# Patient Record
Sex: FEMALE | Race: WHITE | Employment: FULL TIME | URBAN - METROPOLITAN AREA
[De-identification: names, ages, dates, MRNs, and addresses within clinical notes are randomized per-mention and may not be internally consistent; named-entity substitution may affect disease eponyms.]

---

## 2017-05-04 ENCOUNTER — EMPLOYEE WELLNESS (OUTPATIENT)
Dept: OTHER | Age: 27
End: 2017-05-04

## 2017-05-04 LAB
CHOLESTEROL, TOTAL: 200 MG/DL (ref 0–199)
GLUCOSE BLD-MCNC: 79 MG/DL (ref 74–107)
HDLC SERPL-MCNC: 59 MG/DL
LDL CHOLESTEROL CALCULATED: 117 MG/DL (ref 0–99)
TRIGL SERPL-MCNC: 119 MG/DL (ref 0–149)

## 2018-03-20 VITALS — BODY MASS INDEX: 41.52 KG/M2 | WEIGHT: 227 LBS

## 2018-04-23 ENCOUNTER — EMPLOYEE WELLNESS (OUTPATIENT)
Dept: INTERNAL MEDICINE CLINIC | Age: 28
End: 2018-04-23

## 2018-04-23 LAB
CHOLESTEROL, TOTAL: 219 MG/DL (ref 0–199)
GLUCOSE BLD-MCNC: 86 MG/DL (ref 74–107)
HDLC SERPL-MCNC: 53 MG/DL
LDL CHOLESTEROL CALCULATED: 137 MG/DL (ref 0–99)
TRIGL SERPL-MCNC: 147 MG/DL (ref 0–149)

## 2018-05-02 VITALS — BODY MASS INDEX: 42.25 KG/M2 | WEIGHT: 231 LBS

## 2018-05-29 ENCOUNTER — HOSPITAL ENCOUNTER (OUTPATIENT)
Age: 28
Discharge: HOME | End: 2018-05-31
Payer: COMMERCIAL

## 2018-05-29 PROCEDURE — 87491 CHLMYD TRACH DNA AMP PROBE: CPT

## 2018-05-29 PROCEDURE — 88175 CYTOPATH C/V AUTO FLUID REDO: CPT

## 2018-05-29 PROCEDURE — 87591 N.GONORRHOEAE DNA AMP PROB: CPT

## 2018-06-04 LAB
CHLAMYDIA TRACHOMATIS AMPLIFIED DET: NORMAL
N GONORRHOEAE AMPLIFIED DET: NORMAL

## 2019-05-14 ENCOUNTER — EMPLOYEE WELLNESS (OUTPATIENT)
Dept: OTHER | Age: 29
End: 2019-05-14

## 2019-05-14 LAB
CHOLESTEROL, TOTAL: 197 MG/DL (ref 0–199)
GLUCOSE BLD-MCNC: 87 MG/DL (ref 74–107)
HDLC SERPL-MCNC: 60 MG/DL
LDL CHOLESTEROL CALCULATED: 113 MG/DL (ref 0–99)
TRIGL SERPL-MCNC: 120 MG/DL (ref 0–149)

## 2019-05-17 ENCOUNTER — HOSPITAL ENCOUNTER (OUTPATIENT)
Age: 29
Discharge: HOME | End: 2019-05-19
Payer: COMMERCIAL

## 2019-05-17 ENCOUNTER — OFFICE VISIT (OUTPATIENT)
Dept: FAMILY MEDICINE CLINIC | Age: 29
End: 2019-05-17
Payer: COMMERCIAL

## 2019-05-17 VITALS
SYSTOLIC BLOOD PRESSURE: 132 MMHG | OXYGEN SATURATION: 99 % | HEIGHT: 62 IN | TEMPERATURE: 97.6 F | BODY MASS INDEX: 42.76 KG/M2 | HEART RATE: 98 BPM | WEIGHT: 232.4 LBS | DIASTOLIC BLOOD PRESSURE: 84 MMHG

## 2019-05-17 DIAGNOSIS — R53.83 FATIGUE, UNSPECIFIED TYPE: ICD-10-CM

## 2019-05-17 DIAGNOSIS — J02.9 SORE THROAT: ICD-10-CM

## 2019-05-17 DIAGNOSIS — J02.9 SORE THROAT: Primary | ICD-10-CM

## 2019-05-17 LAB — S PYO AG THROAT QL: NORMAL

## 2019-05-17 PROCEDURE — 36415 COLL VENOUS BLD VENIPUNCTURE: CPT

## 2019-05-17 PROCEDURE — 87081 CULTURE SCREEN ONLY: CPT

## 2019-05-17 PROCEDURE — 87880 STREP A ASSAY W/OPTIC: CPT | Performed by: PHYSICIAN ASSISTANT

## 2019-05-17 PROCEDURE — 86308 HETEROPHILE ANTIBODY SCREEN: CPT

## 2019-05-17 PROCEDURE — 99213 OFFICE O/P EST LOW 20 MIN: CPT | Performed by: PHYSICIAN ASSISTANT

## 2019-05-17 RX ORDER — DROSPIRENONE AND ETHINYL ESTRADIOL 0.02-3(28)
KIT ORAL
Refills: 1 | COMMUNITY
Start: 2019-04-17 | End: 2020-11-24 | Stop reason: ALTCHOICE

## 2019-05-17 NOTE — PROGRESS NOTES
oz (105.4 kg)   Height: 5' 2\" (1.575 m)       Exam:  Physical Exam  Nurse's notes and vital signs reviewed. The patient is not hypoxic. General: Alert, no acute distress, patient resting comfortably Patient is not toxic or lethargic. Skin: Warm, intact, no pallor noted. There is no evidence of rash at this time. Head: Normocephalic, atraumatic. Eye: Normal conjunctiva  Ears, Nose, Throat: Right tympanic membrane clear, left tympanic membrane clear. No drainage or discharge noted. No pre- or post-auricular tenderness, erythema, or swelling noted. No rhinorrhea or congestion noted. No facial erythema. Posterior oropharynx shows erythema but no tonsillar hypertrophy, asymmetry or peritonsillar abscess and no evidence of exudate. the uvula is midline. No trismus or drooling is noted. Moist mucous membranes. Neck: No anterior/posterior lymphadenopathy noted. No erythema, no masses, no fluctuance or induration noted. No meningeal signs. Cardio: Regular Rate and Rhythm  Respiratory: No acute distress, no rhonchi, wheezing or crackles noted. No stridor or retractions are noted. Abdomen: Normal bowel sounds, soft, nontender, no masses detected. No rebound, guarding, or rigidity noted. Musculoskeletal: No obvious deformity, no edema, no calf tenderness. No erythema. Neurological: A&O x4, normal speech  Psychiatric: Cooperative         Testing:     Results for orders placed or performed in visit on 05/17/19   POCT rapid strep A   Result Value Ref Range    Strep A Ag None Detected None Detected           Medical Decision Making:     The patient on arrival does not appear to be in any apparent distress or discomfort. Vital signs reviewed and noted to be within normal limits. The patient had rapid strep obtained. Rapid strep was negative. Throat culture will be set up. The patient will be sent for a Monospot test. The patient while the laboratory studies obtained. We'll contact the patient with the results.

## 2019-05-20 LAB
MONO TEST: NEGATIVE
S PYO THROAT QL CULT: NORMAL

## 2019-05-28 VITALS — BODY MASS INDEX: 45.36 KG/M2 | WEIGHT: 248 LBS

## 2019-06-03 ENCOUNTER — HOSPITAL ENCOUNTER (OUTPATIENT)
Age: 29
Discharge: HOME | End: 2019-06-05
Payer: COMMERCIAL

## 2019-06-03 PROCEDURE — 88175 CYTOPATH C/V AUTO FLUID REDO: CPT

## 2019-06-03 PROCEDURE — 87624 HPV HI-RISK TYP POOLED RSLT: CPT

## 2019-06-07 LAB
HPV SAMPLE: NORMAL
HPV TYPE 16: NOT DETECTED
HPV TYPE 18: NOT DETECTED
HPV, HIGH RISK OTHER: NOT DETECTED
INTERPRETATION: NORMAL
SOURCE: NORMAL

## 2019-07-31 ENCOUNTER — OFFICE VISIT (OUTPATIENT)
Dept: FAMILY MEDICINE CLINIC | Age: 29
End: 2019-07-31
Payer: COMMERCIAL

## 2019-07-31 VITALS
OXYGEN SATURATION: 96 % | WEIGHT: 252 LBS | SYSTOLIC BLOOD PRESSURE: 118 MMHG | HEART RATE: 83 BPM | DIASTOLIC BLOOD PRESSURE: 72 MMHG | TEMPERATURE: 97.1 F | BODY MASS INDEX: 46.09 KG/M2

## 2019-07-31 DIAGNOSIS — S99.922A FOOT INJURY, LEFT, INITIAL ENCOUNTER: ICD-10-CM

## 2019-07-31 DIAGNOSIS — S99.912A INJURY OF LEFT ANKLE, INITIAL ENCOUNTER: Primary | ICD-10-CM

## 2019-07-31 PROCEDURE — 99214 OFFICE O/P EST MOD 30 MIN: CPT | Performed by: PHYSICIAN ASSISTANT

## 2019-07-31 PROCEDURE — L4360 PNEUMAT WALKING BOOT PRE CST: HCPCS | Performed by: PHYSICIAN ASSISTANT

## 2019-07-31 NOTE — PROGRESS NOTES
Exam:     Vitals:    07/31/19 1638   BP: 118/72   Pulse: 83   Temp: 97.1 °F (36.2 °C)   SpO2: 96%   Weight: 252 lb (114.3 kg)       Exam:  Physical Exam  Vital signs reviewed and nurse's notes. The patient is not hypoxic. General: Alert, no acute distress, patient resting comfortably   Skin: warm, intact, no pallor noted   Head: Normocephalic, atraumatic   Eye: Normal conjunctiva   Respiratory: No acute distress   Musculoskeletal: No obvious deformity noted to the left lower extremity. No obvious deformity noted to the left foot or the ankle. The patient has reproducible pain to the lateral aspect of the ankle and the lateral aspect of the foot. There is some minimal tenderness over the fifth metatarsal area. There is no significant medial tenderness noted. Pulses intact at DP/PT 2+. No palpable defect of the Achilles tendon. Negative calcaneal squeeze test.  Negative tenderness of the calf. No proximal fibular tenderness. No left hip pain. No significant left knee pain. Neurological: alert and orient x4, normal sensory and motor observed. Psychiatric: Cooperative        Testing:     Xr Ankle Left (min 3 Views)    Result Date: 7/31/2019  LOCATION: 200 EXAM: XR ANKLE LEFT (MIN 3 VIEWS) COMPARISON: None HISTORY: Extremity injury with pain. TECHNIQUE: 3 views of the left ankle were obtained. FINDINGS: The bones, joints, and soft tissues are within normal limits. There is no evidence of fracture or malalignment. No radiopaque foreign body noted. No acute findings. Medical Decision Making:     The patient had x-rays obtained in the office today and formal radiology report is pending at this time. No definite fracture was identified. The patient will be placed in a surgical boot. Patient was offer crutches but declined. She is NV intact status post application of the surgical boot. We did discuss the potential of occult fracture with the patient and the need for followup.  The patient

## 2019-08-02 ENCOUNTER — TELEPHONE (OUTPATIENT)
Dept: PRIMARY CARE CLINIC | Age: 29
End: 2019-08-02

## 2019-08-02 NOTE — TELEPHONE ENCOUNTER
Notes recorded by SHOAIB Pearson III on 7/31/2019 at 5:10 PM EDT  No evidence of acute fracture dislocation of the ankle.  Please have the patient continue the boot for the next 1 week.  Follow-up with PCP. Leni Linton if any of the signs or symptoms worsen.

## 2020-07-25 ENCOUNTER — HOSPITAL ENCOUNTER (EMERGENCY)
Age: 30
Discharge: HOME | End: 2020-07-25
Attending: FAMILY MEDICINE
Payer: COMMERCIAL

## 2020-07-25 ENCOUNTER — APPOINTMENT (OUTPATIENT)
Dept: GENERAL RADIOLOGY | Age: 30
End: 2020-07-25
Payer: COMMERCIAL

## 2020-07-25 ENCOUNTER — NURSE TRIAGE (OUTPATIENT)
Dept: OTHER | Facility: CLINIC | Age: 30
End: 2020-07-25

## 2020-07-25 VITALS
SYSTOLIC BLOOD PRESSURE: 135 MMHG | TEMPERATURE: 97.7 F | WEIGHT: 250 LBS | BODY MASS INDEX: 46.01 KG/M2 | HEART RATE: 88 BPM | RESPIRATION RATE: 16 BRPM | OXYGEN SATURATION: 97 % | HEIGHT: 62 IN | DIASTOLIC BLOOD PRESSURE: 76 MMHG

## 2020-07-25 PROCEDURE — 99283 EMERGENCY DEPT VISIT LOW MDM: CPT

## 2020-07-25 PROCEDURE — 6370000000 HC RX 637 (ALT 250 FOR IP): Performed by: FAMILY MEDICINE

## 2020-07-25 PROCEDURE — 73630 X-RAY EXAM OF FOOT: CPT

## 2020-07-25 RX ORDER — ACETAMINOPHEN 500 MG
1000 TABLET ORAL EVERY 8 HOURS PRN
Qty: 50 TABLET | Refills: 0 | Status: SHIPPED | OUTPATIENT
Start: 2020-07-25

## 2020-07-25 RX ORDER — IBUPROFEN 400 MG/1
400 TABLET ORAL ONCE
Status: COMPLETED | OUTPATIENT
Start: 2020-07-25 | End: 2020-07-25

## 2020-07-25 RX ORDER — IBUPROFEN 400 MG/1
400 TABLET ORAL EVERY 8 HOURS PRN
Qty: 12 TABLET | Refills: 0 | Status: SHIPPED | OUTPATIENT
Start: 2020-07-25

## 2020-07-25 RX ORDER — ACETAMINOPHEN 500 MG
1000 TABLET ORAL ONCE
Status: COMPLETED | OUTPATIENT
Start: 2020-07-25 | End: 2020-07-25

## 2020-07-25 RX ADMIN — IBUPROFEN 400 MG: 400 TABLET, FILM COATED ORAL at 10:34

## 2020-07-25 RX ADMIN — ACETAMINOPHEN 1000 MG: 500 TABLET ORAL at 10:33

## 2020-07-25 ASSESSMENT — PAIN SCALES - GENERAL
PAINLEVEL_OUTOF10: 10
PAINLEVEL_OUTOF10: 7
PAINLEVEL_OUTOF10: 7
PAINLEVEL_OUTOF10: 10

## 2020-07-25 ASSESSMENT — PAIN DESCRIPTION - PAIN TYPE: TYPE: ACUTE PAIN

## 2020-07-25 ASSESSMENT — PAIN DESCRIPTION - LOCATION: LOCATION: FOOT

## 2020-07-25 ASSESSMENT — PAIN DESCRIPTION - ORIENTATION: ORIENTATION: LEFT

## 2020-07-25 NOTE — ED NOTES
Discharge instructions given, patient verbalized their understanding, no other noted or stated problems at this time. Patient will follow up with primary doctor for care.      Pascual Fink RN  07/25/20 0284

## 2020-07-25 NOTE — ED NOTES
Pt presents with left foot pain, swelling and bruising after tripping and falling yesterday     Yrn Blandon RN  07/25/20 [id number]

## 2020-07-25 NOTE — TELEPHONE ENCOUNTER
Reason for Disposition   SEVERE pain (e.g., excruciating, unable to do any normal activities)    Answer Assessment - Initial Assessment Questions  1. ONSET: \"When did the pain start? \"       yesterday  2. LOCATION: \"Where is the pain located? \"       foot  3. PAIN: \"How bad is the pain? \"    (Scale 1-10; or mild, moderate, severe)    -  MILD (1-3): doesn't interfere with normal activities     -  MODERATE (4-7): interferes with normal activities (e.g., work or school) or awakens from sleep, limping     -  SEVERE (8-10): excruciating pain, unable to do any normal activities, unable to walk      7  4. WORK OR EXERCISE: \"Has there been any recent work or exercise that involved this part of the body? \"       *No Answer*  5. CAUSE: \"What do you think is causing the foot pain? \"      *No Answer*  6. OTHER SYMPTOMS: \"Do you have any other symptoms? \" (e.g., leg pain, rash, fever, numbness)      *No Answer*  7. PREGNANCY: \"Is there any chance you are pregnant? \" \"When was your last menstrual period? \"      *No Answer*    Protocols used: FOOT PAIN-ADULT-[place]    [facility] does not have Radiology at office today.   Recommended to follow to with ER for xrays

## 2020-07-25 NOTE — ED PROVIDER NOTES
Department of Emergency Medicine   ED  Provider Note  Admit Date/RoomTime: 7/25/2020  9:57 AM  ED Room: 11/11   Chief Complaint   Foot Pain (fell myesterday left foot painand bruising)    History of Present Illness   Source of history provided by:  patient and spouse/SO. History/Exam Limitations: none. Dieter Hurt is a 30 y.o. old female presenting to the emergency department by private vehicle and ambulatory, for Left foot pain which occured 1 day(s) prior to arrival.  Cause of complaint: injured twisting while at home. There has been a history of no prior problems with this area in the past.  Since onset the symptoms have been moderate in degree with ability to bear weight, but with some pain. Her pain is aggraveated by standing, walking and relieved by ice, OTC NSAIDS and rest.  Tetanus Status: up to date. ROS    Pertinent positives and negatives are stated within HPI, all other systems reviewed and are negative. Past Surgical History:   Procedure Laterality Date    TONSILLECTOMY AND ADENOIDECTOMY      WISDOM TOOTH EXTRACTION     Social History:  reports that she has never smoked. She has never used smokeless tobacco. She reports that she does not drink alcohol or use drugs. Family History: family history is not on file. Allergies: Patient has no known allergies. Physical Exam           ED Triage Vitals   BP Temp Temp Source Pulse Resp SpO2 Height Weight   07/25/20 0939 07/25/20 0939 07/25/20 0939 07/25/20 0939 07/25/20 0939 07/25/20 0939 07/25/20 1001 07/25/20 1001   135/76 97.7 °F (36.5 °C) Temporal 88 16 97 % 5' 2\" (1.575 m) 250 lb (113.4 kg)      Oxygen Saturation Interpretation: Normal.    Constitutional:  Alert, development consistent with age. Neck:  Normal ROM. Supple. Foot: Left lateral 5th metatarsal(s). Tenderness:  mild. Swelling: Mild. Deformity: no.              ROM: diminished range with pain.             Skin:  no erythema, rash or wounds noted. Neurovascular: Motor deficit: none. Sensory deficit:   none. Pulse deficit: none. Capillary refill: normal.  Ankle:               Tenderness:  none. Swelling: None. Deformity: no.             ROM: full range of motion. Skin:  no erythema, rash or wounds noted. Gait:  limp. Lymphatics: No lymphangitis or adenopathy noted. Neurological:  Oriented. Motor functions intact. Lab / Imaging Results   (All laboratory and radiology results have been personally reviewed by myself)  Labs:  No results found for this visit on 07/25/20. Imaging: All Radiology results interpreted by Radiologist unless otherwise noted. XR FOOT LEFT (MIN 3 VIEWS)   Final Result      NO ACUTE FRACTURE OR DISLOCATION LEFT FOOT. ED Course / Medical Decision Making     Medications   acetaminophen (TYLENOL) tablet 1,000 mg (1,000 mg Oral Given 7/25/20 1033)   ibuprofen (ADVIL;MOTRIN) tablet 400 mg (400 mg Oral Given 7/25/20 1034)        Consult(s):   none. Procedure(s):   none    Medical Decision Making:    Films were obtained based on moderate  suspicion for bony injury as per history/physical findings . Plan is subsequently for symptom control, limited use and  immobilization with appropriate outpatient follow-up. Arslan Saucedo Counseling: The emergency provider has spoken with the patient and spouse/SO and discussed todays results, in addition to providing specific details for the plan of care and counseling regarding the diagnosis and prognosis. Questions are answered at this time and they are agreeable with the plan  . Assessment      1.  Sprain of left foot, initial encounter      Plan   Discharge to home  Patient condition is good    New Medications     New Prescriptions    No medications on file     Electronically signed by Anita Moss MD   DD: 7/25/20  **This report was transcribed using voice recognition software. Every effort was made to ensure accuracy; however, inadvertent computerized transcription errors may be present.   END OF ED PROVIDER NOTE        Albina Mayo MD  07/25/20 [id number]

## 2020-08-14 ENCOUNTER — HOSPITAL ENCOUNTER (OUTPATIENT)
Age: 30
Discharge: HOME | End: 2020-08-16
Payer: COMMERCIAL

## 2020-08-14 PROCEDURE — 36415 COLL VENOUS BLD VENIPUNCTURE: CPT

## 2020-08-14 PROCEDURE — 86787 VARICELLA-ZOSTER ANTIBODY: CPT

## 2020-08-14 PROCEDURE — 86762 RUBELLA ANTIBODY: CPT

## 2020-08-14 PROCEDURE — 86706 HEP B SURFACE ANTIBODY: CPT

## 2020-08-14 PROCEDURE — 86735 MUMPS ANTIBODY: CPT

## 2020-08-14 PROCEDURE — 86765 RUBEOLA ANTIBODY: CPT

## 2020-08-17 LAB
HBV SURFACE AB TITR SER: REACTIVE {TITER}
MEASLES IMMUNE (IGG): NORMAL
MUMPS AB IGG: NORMAL
RUBELLA ANTIBODY IGG: NORMAL

## 2020-08-18 LAB — VARICELLA-ZOSTER VIRUS AB, IGG: NORMAL

## 2020-11-24 ENCOUNTER — OFFICE VISIT (OUTPATIENT)
Dept: FAMILY MEDICINE CLINIC | Age: 30
End: 2020-11-24
Payer: COMMERCIAL

## 2020-11-24 VITALS
RESPIRATION RATE: 18 BRPM | BODY MASS INDEX: 47.66 KG/M2 | WEIGHT: 259 LBS | DIASTOLIC BLOOD PRESSURE: 82 MMHG | OXYGEN SATURATION: 99 % | TEMPERATURE: 97.7 F | HEIGHT: 62 IN | SYSTOLIC BLOOD PRESSURE: 126 MMHG | HEART RATE: 105 BPM

## 2020-11-24 PROCEDURE — 99214 OFFICE O/P EST MOD 30 MIN: CPT | Performed by: PHYSICIAN ASSISTANT

## 2020-11-24 RX ORDER — METHOCARBAMOL 750 MG/1
750 TABLET, FILM COATED ORAL 4 TIMES DAILY
Qty: 20 TABLET | Refills: 0 | Status: SHIPPED | OUTPATIENT
Start: 2020-11-24 | End: 2020-11-29

## 2020-11-24 RX ORDER — METHYLPREDNISOLONE 4 MG/1
TABLET ORAL
Qty: 1 KIT | Refills: 0 | Status: SHIPPED
Start: 2020-11-24 | End: 2020-11-30 | Stop reason: ALTCHOICE

## 2020-11-24 NOTE — PROGRESS NOTES
20  Daily Guzman : 1990 Sex: female  Age 30 y.o. Subjective:  Chief Complaint   Patient presents with    Shoulder Pain     right shoulder pain from gym 20         HPI:   Anselmo Kerr , 30 y.o. female presents to express care for evaluation of right shoulder pain. The patient started with this right shoulder pain this morning after getting off of the stairstepper. The patient states that the stairs had gone away and she was still holding onto the railing of the stairstepper and she fell down and it pulled on her right arm. She is having quite a bit of pain to the right shoulder. She having a hard time moving it. The patient is right-hand dominant. The patient denies any other injuries or complaints at this time. ROS:   Unless otherwise stated in this report the patient's positive and negative responses for review of systems for constitutional, eyes, ENT, cardiovascular, respiratory, gastrointestinal, neurological, , musculoskeletal, and integument systems and related systems to the presenting problem are either stated in the history of present illness or were not pertinent or were negative for the symptoms and/or complaints related to the presenting medical problem. Positives and pertinent negatives as per HPI. All others reviewed and are negative. PMH:   No past medical history on file. Past Surgical History:   Procedure Laterality Date    TONSILLECTOMY AND ADENOIDECTOMY      WISDOM TOOTH EXTRACTION         No family history on file.     Medications:     Current Outpatient Medications:     methocarbamol (ROBAXIN-750) 750 MG tablet, Take 1 tablet by mouth 4 times daily for 5 days, Disp: 20 tablet, Rfl: 0    methylPREDNISolone (MEDROL DOSEPACK) 4 MG tablet, Take by mouth., Disp: 1 kit, Rfl: 0    acetaminophen (TYLENOL) 500 MG tablet, Take 2 tablets by mouth every 8 hours as needed for Pain, Disp: 50 tablet, Rfl: 0    ibuprofen (IBU) 400 MG tablet, Take 1 tablet by mouth every 8 hours as needed for Pain Take with full glass of water, Disp: 12 tablet, Rfl: 0    Allergies:   No Known Allergies    Social History:     Social History     Tobacco Use    Smoking status: Never Smoker    Smokeless tobacco: Never Used   Substance Use Topics    Alcohol use: No    Drug use: No       Patient lives at home. Physical Exam:     Vitals:    11/24/20 1412   BP: 126/82   Pulse: 105   Resp: 18   Temp: 97.7 °F (36.5 °C)   SpO2: 99%   Weight: 259 lb (117.5 kg)   Height: 5' 2\" (1.575 m)       Exam:  Physical Exam  Vital signs reviewed and nurse's notes. The patient is not hypoxic. General: Alert, no acute distress, patient resting comfortably   Skin: warm, intact, no pallor noted   Head: Normocephalic, atraumatic   Eye: Normal conjunctiva   Respiratory: No acute distress   Musculoskeletal: No obvious deformity noted to the right upper extremity of the right shoulder. The patient has diffuse tenderness of the right shoulder. Cannot fully assess range of motion due to pain. The patient seem to have increased amount of pain with AROM compared with PROM but was very limited based on her pain. There is no significant pain to the right elbow or the right wrist.  The patient had no cyanosis or mottling. There is no palpable defect or obvious deformity noted  Neurological: alert and orient x4, normal sensory and motor observed. Psychiatric: Cooperative      Testing:           Medical Decision Making:     Vital signs reviewed    Past medical history reviewed. Allergies reviewed. Medications reviewed. Patient on arrival does not appear to be in any apparent distress or discomfort. The patient had x-rays obtained in the office today and formal radiology report is pending at this time. I personally reviewed the x-ray images and did not see any evidence of acute process. The patient was placed in a sling for comfort. The patient was neurovascular intact.   The patient will be given a Medrol Dosepak and Robaxin. If the symptoms do not improve would recommend getting an MRI and follow-up with orthopedics     We did discuss the potential of occult fracture with the patient and the need for followup. The patient understands the need for follow-up and repeat evaluation. The patient was educated on RICE therapy, nsaids, and tylenol. The patient is to return if any of the signs or symptoms worsen. The patient is to follow-up with PCP in the next 2-3 days for repeat evaluation repeat assessment or go directly to the emergency department. Clinical Impression:   Lizette Rain was seen today for shoulder pain. Diagnoses and all orders for this visit:    Acute pain of right shoulder  -     XR SHOULDER RIGHT (MIN 2 VIEWS); Future  -     Cancel: Slings    Other orders  -     methocarbamol (ROBAXIN-750) 750 MG tablet; Take 1 tablet by mouth 4 times daily for 5 days  -     methylPREDNISolone (MEDROL DOSEPACK) 4 MG tablet; Take by mouth. The patient is to call for any concerns or return if any of the signs or symptoms worsen. The patient is to follow-up with PCP in the next 2-3 days for repeat evaluation repeat assessment or go directly to the emergency department.      SIGNATURE: Graciela Hoover III, PA-C

## 2020-11-30 ENCOUNTER — OFFICE VISIT (OUTPATIENT)
Dept: FAMILY MEDICINE CLINIC | Age: 30
End: 2020-11-30
Payer: COMMERCIAL

## 2020-11-30 VITALS
HEART RATE: 111 BPM | SYSTOLIC BLOOD PRESSURE: 126 MMHG | WEIGHT: 259 LBS | BODY MASS INDEX: 47.66 KG/M2 | HEIGHT: 62 IN | TEMPERATURE: 97.5 F | RESPIRATION RATE: 18 BRPM | OXYGEN SATURATION: 98 % | DIASTOLIC BLOOD PRESSURE: 80 MMHG

## 2020-11-30 PROCEDURE — 99214 OFFICE O/P EST MOD 30 MIN: CPT | Performed by: PHYSICIAN ASSISTANT

## 2020-11-30 PROCEDURE — 96372 THER/PROPH/DIAG INJ SC/IM: CPT | Performed by: PHYSICIAN ASSISTANT

## 2020-11-30 RX ORDER — METHYLPREDNISOLONE ACETATE 40 MG/ML
40 INJECTION, SUSPENSION INTRA-ARTICULAR; INTRALESIONAL; INTRAMUSCULAR; SOFT TISSUE ONCE
Status: COMPLETED | OUTPATIENT
Start: 2020-11-30 | End: 2020-11-30

## 2020-11-30 RX ADMIN — METHYLPREDNISOLONE ACETATE 40 MG: 40 INJECTION, SUSPENSION INTRA-ARTICULAR; INTRALESIONAL; INTRAMUSCULAR; SOFT TISSUE at 15:05

## 2020-11-30 NOTE — PROGRESS NOTES
20  Daily Guzman : 1990 Sex: female  Age 30 y.o. Subjective:  Chief Complaint   Patient presents with    Shoulder Pain         HPI:   Jacques Torrez , 30 y.o. female presents to [facility] care for evaluation of right shoulder pain. The patient was seen and evaluated on 2020 for right shoulder strain. The patient states that she was getting off of the stairstepper and she caught herself with her right arm holding onto the railing and pulled and strained the right shoulder. The patient had x-rays obtained that did not show any evidence of acute abnormality. The patient was given medication for home and she does seem to be improving but it is not progressing as quickly as she would like. She still has quite a bit of pain with AROM compared with PROM. ROS:   Unless otherwise stated in this report the patient's positive and negative responses for review of systems for constitutional, eyes, ENT, cardiovascular, respiratory, gastrointestinal, neurological, , musculoskeletal, and integument systems and related systems to the presenting problem are either stated in the history of present illness or were not pertinent or were negative for the symptoms and/or complaints related to the presenting medical problem. Positives and pertinent negatives as per HPI. All others reviewed and are negative. PMH:   No past medical history on file. Past Surgical History:   Procedure Laterality Date    TONSILLECTOMY AND ADENOIDECTOMY      WISDOM TOOTH EXTRACTION         No family history on file.     Medications:     Current Outpatient Medications:     acetaminophen (TYLENOL) 500 MG tablet, Take 2 tablets by mouth every 8 hours as needed for Pain, Disp: 50 tablet, Rfl: 0    ibuprofen (IBU) 400 MG tablet, Take 1 tablet by mouth every 8 hours as needed for Pain Take with full glass of water, Disp: 12 tablet, Rfl: 0    Allergies:   No Known Allergies    Social History:     Social History Tobacco Use    Smoking status: Never Smoker    Smokeless tobacco: Never Used   Substance Use Topics    Alcohol use: No    Drug use: No       Patient lives at home. Physical Exam:     Vitals:    11/30/20 1445   BP: 126/80   Pulse: 111   Resp: 18   Temp: 97.5 °F (36.4 °C)   SpO2: 98%   Weight: 259 lb (117.5 kg)   Height: 5' 2\" (1.575 m)       Exam:  Physical Exam  Vital signs reviewed and nurse's notes. The patient is not hypoxic. General: Alert, no acute distress, patient resting comfortably   Skin: warm, intact, no pallor noted   Head: Normocephalic, atraumatic   Eye: Normal conjunctiva   Respiratory: No acute distress   Musculoskeletal: No obvious deformity noted to the right shoulder. The patient had more reproducible pain in the anterior aspect of the right shoulder. The patient had limited AROM due to pain. The patient had 4/5 strength with resistance with abduction, 5/5 strength with abduction, flexion and extension. There is no significant deficit to the right elbow or the right wrist.  No cyanosis or mottling. Neurological: alert and orient x4, normal sensory and motor observed. Psychiatric: Cooperative        Testing:     Imaging reviewed    MRI pending      Medical Decision Making:     Vital signs reviewed    Past medical history reviewed. Allergies reviewed. Medications reviewed. Patient on arrival does not appear to be in any apparent distress or discomfort. The patient's previous x-rays reviewed. The patient will be treated with intramuscular injection of methylprednisone. The patient will continue with the sling. Referral placed to orthopedics and will try to obtain an MRI of the right shoulder. The patient understands plan has no other questions. Continue with rest, ice. Clinical Impression:   Kaylene Vasquez was seen today for shoulder pain.     Diagnoses and all orders for this visit:    Shoulder strain, right, initial encounter  -     MRI SHOULDER RIGHT WO CONTRAST; Future  -     [address] [facility], , [address] [facility], [facility]    Other orders  -     methylPREDNISolone acetate (DEPO-MEDROL) injection 40 mg        The patient is to call for any concerns or return if any of the signs or symptoms worsen. The patient is to follow-up with PCP in the next 2-3 days for repeat evaluation repeat assessment or go directly to the emergency department.      SIGNATURE: Juliet Whittington III, PA-C

## 2020-12-09 ENCOUNTER — HOSPITAL ENCOUNTER (OUTPATIENT)
Dept: MRI IMAGING | Age: 30
Discharge: HOME | End: 2020-12-11
Payer: COMMERCIAL

## 2020-12-09 PROCEDURE — 73221 MRI JOINT UPR EXTREM W/O DYE: CPT

## 2020-12-11 ENCOUNTER — OFFICE VISIT (OUTPATIENT)
Dept: ORTHOPEDIC SURGERY | Age: 30
End: 2020-12-11
Payer: COMMERCIAL

## 2020-12-11 VITALS — DIASTOLIC BLOOD PRESSURE: 84 MMHG | TEMPERATURE: 98.3 F | SYSTOLIC BLOOD PRESSURE: 118 MMHG | HEART RATE: 85 BPM

## 2020-12-11 PROCEDURE — 99202 OFFICE O/P NEW SF 15 MIN: CPT | Performed by: ORTHOPAEDIC SURGERY

## 2020-12-11 PROCEDURE — 99203 OFFICE O/P NEW LOW 30 MIN: CPT | Performed by: ORTHOPAEDIC SURGERY

## 2020-12-11 NOTE — PROGRESS NOTES
Chief Complaint   Patient presents with    Fracture     New Patient, Right shoulder greater tuberosity FX, 11/24/20. Patient presents today arm in sling. Patient states she was at the gym , and fell off the stairmaster. She states no pain, unless she tries using the left arm for something. She states she uses her Sling only at home. She does have a slight ache that happens randomly throughout her day, nothing she can put a number to. SUBJECTIVE: Roslyn Fermin is a 26 y.o. right hand dominant female who presents to office due to the above chief complaint. She reports she was on a stair climber when she started to slip but her right upper extremity was still holding onto the railing. Patient was able to catch her self but experienced immediate right shoulder pain. She sought evaluation in the ED where radiographs were read as negative. Patient continued to have right shoulder pain and had an MRI 2 weeks later. Patient is currently complaining of right lateral shoulder pain with no numbness, tingling, or radiating pain. 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 Review of Systems   Constitutional: Negative for fever, chills, diaphoresis, appetite change and fatigue. HENT: Negative for dental issues, hearing loss and tinnitus. Negative for congestion, sinus pressure, sneezing, sore throat. Negative for headache. Eyes: Negative for visual disturbance, blurred and double vision. Negative for pain, discharge, redness and itching  Respiratory: Negative for cough, shortness of breath and wheezing. Cardiovascular: Negative for chest pain, palpitations and leg swelling. No dyspnea on exertion   Gastrointestinal:   Negative for nausea, vomiting, abdominal pain, diarrhea, constipation  or black or bloody. Hematologic\Lymphatic:  negative for bleeding, petechiae,   Genitourinary: Negative for hematuria and difficulty urinating.    Musculoskeletal: Negative for neck pain and stiffness. Negative for back pain, see HPI  Skin: Negative for pallor, rash and wound. Neurological: Negative for dizziness, tremors, seizures, weakness, light-headedness, no TIA or stroke symptoms. No numbness and headaches. Psychiatric/Behavioral: Negative. OBJECTIVE:      Physical Examination:   General appearance: alert, well appearing, and in no distress,  normal appearing weight. No visible signs of trauma   Mental status: alert, oriented to person, place, and time, normal mood, behavior, speech, dress, motor activity, and thought processes  Abdomen: soft, nondistended  Resp:   resp easy and unlabored, no audible wheezes note, normal symmetrical expansion of both hemithoraces  Cardiac: distal pulses palpable, skin and extremities well perfused  Neurological: alert, oriented X3, normal speech, no focal findings or movement disorder noted, motor and sensory grossly normal bilaterally, normal muscle tone, no tremors, strength 5/5, normal gait and station  HEENT: normochephalic atraumatic, external ears and eyes normal, sclera normal, neck supple, no nasal discharge. Extremities:   peripheral pulses normal, no edema, redness or tenderness in the calves   Skin: normal coloration, no rashes or open wounds, no suspicious skin lesions noted  Psych: Affect euthymic   Musculoskeletal:   Extremity:  Right upper extremity:  · Skin intact circumferentially  · +TTP lateral shoulder  · Compartments soft and compressible  · Axillary nerve function intact grossly  · +AIN/PIN/Ulnar nerve function intact grossly  · +Radial pulse, Brisk Cap refill, hand warm and perfused  · Sensation intact to touch in radial/ulnar/median nerve distributions to hand    /84 (Site: Right Lower Arm, Position: Sitting)   Pulse 85   Temp 98.3 °F (36.8 °C) (Oral)      XR:   X-ray right shoulder 11/24/2020: Nondisplaced greater tuberosity fracture. No other fractures or dislocations present.     MRI right shoulder 12/9/2020: Nondisplaced greater tuberosity fracture with bony edema present. No other fracture or soft tissue abnormalities appreciated. ASSESSMENT:  Right greater tuberosity fracture    PLAN:  Nonweightbearing right upper extremity  Sling to right upper extremity to help immobilize them for comfort  Multimodal over-the-counter pain remedies as needed  No lifting or extreme range of motion with the right upper extremity  Range of motion exercises to the elbow, wrist, and hand several times daily  Follow-up in 4 weeks  Patient seen and examined and plan discussed with Dr. Adriana Galicia      Electronically signed by Shoshana Gonzalez DO on 12/11/2020 at 8:28 AM     Note: This report was completed using computerize voiced recognition software. Every effort has been made to ensure accuracy; however, inadvertent computerized transcription errors may be present. Orthopaedic Attending    I have seen and evaluated the patient with the resident and agree with the above assessments on today's visit. I have performed the key components of the history and physical examination and concur completely with the findings and plans as documented above. Patient here for initial evaluation for right shoulder injury. She was on a stair climber when she fell trying to catch her weight from going down. Injured the shoulder at that time. Initial x-rays which were inconclusive therefore had an MRI demonstrating a greater tuberosity fracture on the right proximal humerus which is nondisplaced. She presents today for initial evaluation discussion. Pain is improved since her injury. Clinical exam as above. Discussed treatment options and patient is in agreement for close treatment. She will maintain no lifting to the right shoulder at this time continue with her sling and OTC medications. Can work on passive ROM of the shoulder and active ROM of the hand and elbow.   To see us back in 4 weeks for repeat evaluation or sooner if needed.     Electronically signed by   Robert Ordonez, DO  12/11/2020

## 2020-12-11 NOTE — PATIENT INSTRUCTIONS
Nonweightbearing right upper extremity  Sling to right upper extremity to help immobilize them for comfort  Multimodal over-the-counter pain remedies as needed  No lifting or extreme range of motion with the right upper extremity  Range of motion exercises to the elbow, wrist, and hand several times daily  Follow-up in 4 weeks

## 2020-12-12 PROBLEM — S42.254A NONDISPLACED FRACTURE OF GREATER TUBEROSITY OF RIGHT HUMERUS, INITIAL ENCOUNTER FOR CLOSED FRACTURE: Status: ACTIVE | Noted: 2020-12-12

## 2020-12-28 ENCOUNTER — TELEPHONE (OUTPATIENT)
Dept: ORTHOPEDIC SURGERY | Age: 30
End: 2020-12-28

## 2020-12-29 NOTE — TELEPHONE ENCOUNTER
Patient called back in today to see if her forms for [employer] from [employer] and [employer] were faxed over to us yet. Asking for a call back to let her know if we received these forms yet.     Call back #[phone]

## 2021-01-08 ENCOUNTER — OFFICE VISIT (OUTPATIENT)
Dept: ORTHOPEDIC SURGERY | Age: 31
End: 2021-01-08
Payer: COMMERCIAL

## 2021-01-08 ENCOUNTER — HOSPITAL ENCOUNTER (OUTPATIENT)
Dept: GENERAL RADIOLOGY | Age: 31
Discharge: HOME | End: 2021-01-10
Payer: COMMERCIAL

## 2021-01-08 VITALS — TEMPERATURE: 98.7 F

## 2021-01-08 DIAGNOSIS — S42.254D CLOSED NONDISPLACED FRACTURE OF GREATER TUBEROSITY OF RIGHT HUMERUS WITH ROUTINE HEALING, SUBSEQUENT ENCOUNTER: Primary | ICD-10-CM

## 2021-01-08 DIAGNOSIS — S42.254A NONDISPLACED FRACTURE OF GREATER TUBEROSITY OF RIGHT HUMERUS, INITIAL ENCOUNTER FOR CLOSED FRACTURE: ICD-10-CM

## 2021-01-08 DIAGNOSIS — S42.254A CLOSED NONDISPLACED FRACTURE OF GREATER TUBEROSITY OF RIGHT HUMERUS, INITIAL ENCOUNTER: ICD-10-CM

## 2021-01-08 PROCEDURE — 73030 X-RAY EXAM OF SHOULDER: CPT

## 2021-01-08 PROCEDURE — 99213 OFFICE O/P EST LOW 20 MIN: CPT | Performed by: ORTHOPAEDIC SURGERY

## 2021-01-08 PROCEDURE — 99212 OFFICE O/P EST SF 10 MIN: CPT | Performed by: ORTHOPAEDIC SURGERY

## 2021-01-08 NOTE — PROGRESS NOTES
Chief Complaint   Patient presents with    Shoulder Injury     R shoulder greater tuberosity fx 11/24. MRI done 12/9       SUBJECTIVE: Rachel Brito is a 26 y.o. right-hand-dominant female who presents to office due to the above chief complaint. She reports moderate improvement in right shoulder pain since her previous visit. She has been off work for the last 4 weeks secondary to decreased range of motion, strength, and right shoulder pain. She denies use of NSAIDs. Denies any other orthopedic complaints this time. Review of Systems   Constitutional: Negative for fever, chills, diaphoresis, appetite change and fatigue. HENT: Negative for dental issues, hearing loss and tinnitus. Negative for congestion, sinus pressure, sneezing, sore throat. Negative for headache. Eyes: Negative for visual disturbance, blurred and double vision. Negative for pain, discharge, redness and itching  Respiratory: Negative for cough, shortness of breath and wheezing. Cardiovascular: Negative for chest pain, palpitations and leg swelling. No dyspnea on exertion   Gastrointestinal:   Negative for nausea, vomiting, abdominal pain, diarrhea, constipation  or black or bloody. Hematologic\Lymphatic:  negative for bleeding, petechiae,   Genitourinary: Negative for hematuria and difficulty urinating. Musculoskeletal: Negative for neck pain and stiffness. Negative for back pain, see HPI  Skin: Negative for pallor, rash and wound. Neurological: Negative for dizziness, tremors, seizures, weakness, light-headedness, no TIA or stroke symptoms. No numbness and headaches. Psychiatric/Behavioral: Negative. OBJECTIVE:      Physical Examination:   General appearance: alert, well appearing, and in no distress,  normal appearing weight.  No visible signs of trauma   Mental status: alert, oriented to person, place, and time, normal mood, behavior, speech, dress, motor activity, and thought processes Abdomen: soft, nondistended  Resp:   resp easy and unlabored, no audible wheezes note, normal symmetrical expansion of both hemithoraces  Cardiac: distal pulses palpable, skin and extremities well perfused  Neurological: alert, oriented X3, normal speech, no focal findings or movement disorder noted, motor and sensory grossly normal bilaterally, normal muscle tone, no tremors, strength 5/5, normal gait and station  HEENT: normochephalic atraumatic, external ears and eyes normal, sclera normal, neck supple, no nasal discharge. Extremities:   peripheral pulses normal, no edema, redness or tenderness in the calves   Skin: normal coloration, no rashes or open wounds, no suspicious skin lesions noted  Psych: Affect euthymic   Musculoskeletal:   Extremity:  Right upper extremity:  · Skin intact circumferentially  · +TTP greater tuberosity  · Compartments soft and compressible  · +AIN/PIN/Ulnar nerve function intact grossly  · +Radial pulse, Brisk Cap refill, hand warm and perfused  · Sensation intact to touch in radial/ulnar/median nerve distributions to hand  · Flexion 160 degrees  · Abduction 110 degrees  · External rotation 40 degrees  · Internal rotation to T8  · Weakness with Agustina test  · External rotation weakness    Temp 98.7 °F (37.1 °C)      XR: 1/8/21   X-ray right shoulder: Nondisplaced fracture of the greater tuberosity. Fracture alignment remains unchanged compared to prior radiographs. No other fractures or dislocations appreciated. ASSESSMENT:     Diagnosis Orders   1.  Closed nondisplaced fracture of greater tuberosity of right humerus with routine healing, subsequent encounter  [address] [facility], [facility]       Discussion: Patient would likely benefit from formal physical therapy focusing on right upper extremity range of motion and strengthening. Patient also benefit from a trial of over-the-counter nonsteroidal anti-inflammatories. Patient was in agreement with treatment plan and verbalized understanding. All questions were answered. PLAN:  Activity modifications right upper extremity, no lifting with arm  Patient is able to return to work if light duty available  Physical therapy  Follow-up in 4 weeks with repeat imaging  Patient seen and examined with Dr. Jake Pitt      Electronically signed by Fredy Bernabe DO on 1/8/2021 at 9:16 AM  Note: This report was completed using computerize voiced recognition software. Every effort has been made to ensure accuracy; however, inadvertent computerized transcription errors may be present. Orthopaedic Attending    I have seen and evaluated the patient with the resident and agree with the above assessments on today's visit. I have performed the key components of the history and physical examination and concur completely with the findings and plans as documented above. Commend patient start formal physical therapy to work on regaining ROM. She can continue with NSAIDs. Discussed lifting restrictions with the right upper extremity. Discussion may return to light duty if available. Like to see her back in office in 4 weeks for repeat evaluation with repeat imaging.     Electronically signed by   Kristina Aguirre DO  1/8/2021

## 2021-01-08 NOTE — PROGRESS NOTES
Patient here for f/u for R shoulder fx. Had MRI done 12/9.  States she only has pain when she moves it a certain way, states it is torture when she tries to get dressed, but has no pain otherwise Writer spoke with financial counselor who attempted to meet with daughter to complete pending medicaid application. Daughter left for the day and financial counselor is unable to do MA application over the phone. Writer updated Luis at [facility] who states he will talk with weekend CM who will have to ask the daughter financial information tomorrow. He will not look at referral until this information is obtained. Neurology and nephrology also consulted on case to make recommendations.  GLORIA REYNOSO

## 2021-01-12 ENCOUNTER — HOSPITAL ENCOUNTER (OUTPATIENT)
Dept: PHYSICAL THERAPY | Age: 31
Setting detail: THERAPIES SERIES
Discharge: HOME | End: 2021-01-12
Payer: COMMERCIAL

## 2021-01-12 PROCEDURE — 97161 PT EVAL LOW COMPLEX 20 MIN: CPT

## 2021-01-12 NOTE — PROGRESS NOTES
[address] [facility]                Phone: [phone]   Fax: [phone]    Physical Therapy Initial Evaluation  Date:  2021    Patient Name:  Romayne Pickett    :  1990  MRN: [id number]  Referring Physician:  Shandra Gustafson 23 Information:  [facility]     Evaluation date:  21  Diagnosis:  R humeral fracture in 2020 (non-op)   Precautions:  No heavy lifting   Evaluating Physical Therapist:  Darwin Ramos DPT      Subjective:  History/Onset of Symptoms:  Pt reports that back in 2020 she was on the stair climber machine at the gym when it stopped on her and she fell while holding on still and twisted her R arm. Initially, she was told that there was no fracture on imaging. After a week of continued pain she eventually had injection to R shoulder and MRI which showed a fracture. Pt has been off work and essentially resting her arm. Was initially using a sling but has discontinued that due to minimal pain in RUE. Pain:  0/10 at rest and min/ mod with shoulder abduction and and range flexion        Sensation:  Denies numbness and tingling to RUE     Previous methods of Treatment:  No therapy previously      Pt is R hand dominant      Objective:    R shoulder AROM    Flexion:  160 °    Abduction: 160 °    IR:  T8    ER:  T1     Strength:  Grossly 5/5 R hand/ elbow   R shoulder grossly 4/5  Shoulder mechanics are normal.    Does have increased \"deep ache\" with active abduction and end range flexion       Summary/Assessment:    Pt presents to outpatient physical therapy with R shoulder pain, weakness, decreased ROM, and delayed bone healing on imaging after R humeral fracture.         Plan:     Below checked are areas for improvement during physical therapy POC:        [x]  Pain reduction  []  Balance Improvement       [x]  Strengthening  [x]  Postural Improvement   [x]  Range of Motion  [x]  Soft Tissue Improvement    []

## 2021-01-19 ENCOUNTER — HOSPITAL ENCOUNTER (OUTPATIENT)
Dept: PHYSICAL THERAPY | Age: 31
Setting detail: THERAPIES SERIES
Discharge: HOME | End: 2021-01-19
Payer: COMMERCIAL

## 2021-01-19 PROCEDURE — 97110 THERAPEUTIC EXERCISES: CPT

## 2021-01-19 NOTE — PROGRESS NOTES
[address] [facility]                Phone: [phone]   Fax: [phone]    Physical Therapy Daily Treatment Note  Date:  2021    Patient Name:  Cynthia Taylor    :  1990  MRN: [id number]    Referring Physician:  Tomasa Royal  Insurance Information:  [employer]      Evaluation date:  21  Diagnosis:  R humeral fracture in 2020 (non-op)   Precautions:  No heavy lifting   Evaluating Physical Therapist:  Aramis Coronado DPT  Plan of care signed (Y/N):    Visit# / total visits: -       Subjective:   Pt reports that she is doing well. 1/10 pain in R shoulder and described as \"aching\" at times. Doing HEP at least 3x per day with good tolerance. Exercises:   Exercise/Equipment Reps  During/ after  Other comments   HEP Pendulums  30 each      HEP Table slides    Flexion 30  Abduction 30   ER 30              HEP Wall ladder  10x10 sec                                                                           Home Exercise Program:       Comments:  Pt tolerated session well with no pain. Abduction table slides adjusted so that pt is pushing up with opposite arm to decrease pressure through RUE. Pt is going to continue HEP at home till next ortho follow-up in order to conserve therapy visits for strengthening phase if needed once humerus is beginning to heal.  Pt in agreement with this POC.         Treatment/Activity Tolerance:  [x] Patient tolerated treatment well [] Patient limited by fatique  [] Patient limited by pain  [] Patient limited by other medical complications  [] Other:     Prognosis: [x] Good [] Fair  [] Poor    Patient Requires Follow-up: [] Yes  [x] No    Plan:   [] Continue per plan of care [] Alter current plan (see comments)  [] Plan of care initiated [x] Hold pending MD visit [] Discharge    Plan for Next Session:        See Weekly Progress Note: []  Yes  []  No  Next due:          CPT codes 2020 Units    Low Complexity PT evaluation 22700 60134    Moderate Complexity PT evaluation  26425    High Complexity PT evaluation [id number]    PT Re-evaluation  [id number]    Gait training [id number]    Manual therapy  59703    Therapeutic activities  09946    Therapeutic exercises  75597 2   Neuromuscular reeducation  [id number]        Time In: 1100  Time Out: 1130    Electronically signed by:    Michelle Ace DPT  [id number]

## 2021-02-08 DIAGNOSIS — S42.254D CLOSED NONDISPLACED FRACTURE OF GREATER TUBEROSITY OF RIGHT HUMERUS WITH ROUTINE HEALING, SUBSEQUENT ENCOUNTER: Primary | ICD-10-CM

## 2021-02-11 ENCOUNTER — HOSPITAL ENCOUNTER (OUTPATIENT)
Dept: GENERAL RADIOLOGY | Age: 31
Discharge: HOME | End: 2021-02-13
Payer: COMMERCIAL

## 2021-02-11 ENCOUNTER — OFFICE VISIT (OUTPATIENT)
Dept: ORTHOPEDIC SURGERY | Age: 31
End: 2021-02-11
Payer: COMMERCIAL

## 2021-02-11 VITALS — TEMPERATURE: 98.4 F

## 2021-02-11 DIAGNOSIS — S42.254A NONDISPLACED FRACTURE OF GREATER TUBEROSITY OF RIGHT HUMERUS, INITIAL ENCOUNTER FOR CLOSED FRACTURE: Primary | ICD-10-CM

## 2021-02-11 DIAGNOSIS — S42.254D CLOSED NONDISPLACED FRACTURE OF GREATER TUBEROSITY OF RIGHT HUMERUS WITH ROUTINE HEALING, SUBSEQUENT ENCOUNTER: ICD-10-CM

## 2021-02-11 PROCEDURE — 99212 OFFICE O/P EST SF 10 MIN: CPT

## 2021-02-11 PROCEDURE — 73030 X-RAY EXAM OF SHOULDER: CPT

## 2021-02-11 PROCEDURE — 99213 OFFICE O/P EST LOW 20 MIN: CPT | Performed by: PHYSICIAN ASSISTANT

## 2021-02-11 NOTE — PROGRESS NOTES
Kary Early is a 26 y.o. female who presents for follow up of [facility]    SURGEON: Dr. Addison Aguila,   Date of Injury/Surgery: 11/24/20  Date last seen in office: 01/08/21    Symptoms: better  New complaints: sore    Weightbearing: nonweightbearing to RUE      Assistive device No Device  Participating in therapy (location if yes)?  yes, [facility]    Refills Needed: None  Order/Referral Needed: no

## 2021-02-16 ENCOUNTER — HOSPITAL ENCOUNTER (OUTPATIENT)
Dept: PHYSICAL THERAPY | Age: 31
Setting detail: THERAPIES SERIES
Discharge: HOME | End: 2021-02-16
Payer: COMMERCIAL

## 2021-02-16 NOTE — PROGRESS NOTES
[address] [facility]                Phone: [phone]  Fax: [phone]    Physical Therapy  Cancellation/No-show Note  Patient Name:  Kary Early  :  1990   Date:  2021    For today's appointment patient:  [x]  Cancelled  [x]  Rescheduled appointment  []  No-show     Reason given by patient:  []  Patient ill  []  Conflicting appointment  []  No transportation    []  Conflict with work  []  No reason given  [x]  Other:  Pt was unable to get into her car this morning due to ice.           Electronically signed by:    Michelle Ace DPT  [id number]

## 2021-02-19 ENCOUNTER — HOSPITAL ENCOUNTER (OUTPATIENT)
Dept: PHYSICAL THERAPY | Age: 31
Setting detail: THERAPIES SERIES
Discharge: HOME | End: 2021-02-19
Payer: COMMERCIAL

## 2021-02-19 PROCEDURE — 97110 THERAPEUTIC EXERCISES: CPT

## 2021-02-19 NOTE — PROGRESS NOTES
[address] [facility]                Phone: [phone]   Fax: [phone]    Physical Therapy Daily Treatment Note  Date:  2021    Patient Name:  Valerie Anne    :  1990  MRN: [id number]    Referring Physician:  Vidya gNuyen  Insurance Information:  [facility]      Evaluation date:  21  Diagnosis:  R humeral fracture in 2020 (non-op)   Precautions:  No heavy lifting   Evaluating Physical Therapist:  Nichelle Given, DPT  Plan of care signed (Y/N):    Visit# / total visits: 3 / 6-       Subjective:   Pt reports that her arm is feeling better. She denies pain prior to session today. States that she continues to be compliant with HEP. Had a follow-up with orthopedics and she is now cleared to begin gentle strengthening starting out with no more than 3# of resistance and progressing as tolerated. Next follow-up:  2021     Exercises:   Exercise/Equipment Reps  During/ after  Other comments    UBE  10 min  No resistance   Alt direction every 2 min  Pt reports no pain during/ after    HEP Pendulums  30 each      HEP Table slides    Flexion 20  Abduction 20   ER 20     HEP Wall ladder  10x10 sec                Shoulder strengthening- prone  Flexion 2x10   Abduction 2x10   Extension 2x10    Retraction 2x10  Rows 2x10     1# dumbbell for all                                                          Home Exercise Program:       Comments:  Pt tolerated session well today with minimal to no pain. She reports slight discomfort at end range row and shoulder extension exercises that goes away as soon as she rests.   Pt reports 3/10 R shoulder muscle fatigue by end of session     Treatment/Activity Tolerance:  [x] Patient tolerated treatment well [] Patient limited by fatique  [] Patient limited by pain  [] Patient limited by other medical complications  [] Other:     Prognosis: [x] Good [] Fair  [] Poor    Patient Requires Follow-up: [] Yes  [x] No    Plan:   [x] Continue per plan of care [] Alter current plan (see comments)  [] Plan of care initiated [] Hold pending MD visit [] Discharge    Plan for Next Session:    Progress R shoulder strengthening as tolerated next session      See Weekly Progress Note: []  Yes  []  No  Next due:          CPT codes 6/2/2020 Units    Low Complexity PT evaluation 38837 66677    Moderate Complexity PT evaluation  17645    High Complexity PT evaluation 34800    PT Re-evaluation  51222    Gait training 06464    Manual therapy  [id number]    Therapeutic activities  45607    Therapeutic exercises  50157 2   Neuromuscular reeducation  [phone]        Time In: 0900  Time Out: 0935    Electronically signed by:    Gil Bella DPT  [id number]

## 2021-02-23 ENCOUNTER — HOSPITAL ENCOUNTER (OUTPATIENT)
Dept: PHYSICAL THERAPY | Age: 31
Setting detail: THERAPIES SERIES
Discharge: HOME | End: 2021-02-23
Payer: COMMERCIAL

## 2021-02-23 PROCEDURE — 97110 THERAPEUTIC EXERCISES: CPT

## 2021-02-23 NOTE — PROGRESS NOTES
[address] [facility]                Phone: [phone]   Fax: [phone]    Physical Therapy Daily Treatment Note  Date:  2021    Patient Name:  Prasanna Zuniga    :  1990  MRN: [id number]    Referring Physician:  Zeny Acosta  Insurance Information:  [employer] [facility]      Evaluation date:  21  Diagnosis:  R humeral fracture in 2020 (non-op)   Precautions:  No heavy lifting   Evaluating Physical Therapist:  Kylah Meredith DPT  Plan of care signed (Y/N):    Visit# / total visits: -       Subjective:   Pt reports mild muscle soreness last approximately 1-2 days following last session. She denies pain today. States that she feels a \"pop\" at times when shoulder moves from extension into flexion. Next follow-up:  2021     Exercises:   Exercise/Equipment Reps  During/ after  Other comments    UBE  8 min  Level #3   Alt direction every 2 min  Pt reports no pain during/ after     Pulleys  3 min flexion   3 min abduction              Wall ladder  5x10 sec                Shoulder strengthening- prone  Flexion 3x10   Abduction 3x10   Extension 3x10    Retraction 3x10  Rows 3x10     1# dumbbell for all             No money ER with band  1x10  YTB                                            Home Exercise Program:    Table slides   Wall walks   No money ER with YTB added on 21     Comments:  Pt tolerated session well today with minimal to no pain.   She reports slight fatigue with last set of prone rows       Treatment/Activity Tolerance:  [x] Patient tolerated treatment well [] Patient limited by fatique  [] Patient limited by pain  [] Patient limited by other medical complications  [] Other:     Prognosis: [x] Good [] Fair  [] Poor    Patient Requires Follow-up: [] Yes  [x] No    Plan:   [x] Continue per plan of care [] Alter current plan (see comments)  [] Plan of care initiated [] Hold pending MD visit [] Discharge    Plan for Next Session:    Progress R shoulder strengthening as tolerated next session      See Weekly Progress Note: []  Yes  []  No  Next due:          CPT codes 6/2/2020 Units    Low Complexity PT evaluation 94626 67508    Moderate Complexity PT evaluation  45269    High Complexity PT evaluation 23685    PT Re-evaluation  12305    Gait training 21932    Manual therapy  [id number]    Therapeutic activities  03588    Therapeutic exercises  97276 2   Neuromuscular reeducation  [phone]        Time In: 7124  Time Out: [address]    Electronically signed by:    Harvel Mohs, DPT  [id number]

## 2021-02-26 ENCOUNTER — HOSPITAL ENCOUNTER (OUTPATIENT)
Dept: PHYSICAL THERAPY | Age: 31
Setting detail: THERAPIES SERIES
Discharge: HOME | End: 2021-02-26
Payer: COMMERCIAL

## 2021-02-26 ENCOUNTER — TELEPHONE (OUTPATIENT)
Dept: ORTHOPEDIC SURGERY | Age: 31
End: 2021-02-26

## 2021-02-26 PROCEDURE — 97110 THERAPEUTIC EXERCISES: CPT

## 2021-02-26 NOTE — TELEPHONE ENCOUNTER
Patient called office with concerns regarding absence paperwork. She was told the absence expires 03/08/21. Next office visit is 03/11/21. Reviewed paperwork, disability return to work date estimated 03/18/21. Call placed to absence services. Spoke to Tammy Gee. Confirmed leave is until 03/18/21. Tammy Gee will send over new paperwork to be filled out after patient's appointment 03/11/21. Call placed to patient and notified of confirmation of absence dates. Patient verbalized understanding.      Future Appointments   Date Time Provider Silvano Calderón   3/4/2021  8:00 AM JO ANN Delgadillo PT   3/11/2021  8:00 AM DO SE Pauly [facility]

## 2021-02-26 NOTE — PROGRESS NOTES
[address] [facility]                Phone: [phone]   Fax: [phone]    Physical Therapy Daily Treatment Note  Date:  2021    Patient Name:  Rosalind Lemos    :  1990  MRN: [id number]    Referring Physician:  Torrie Vizcaino  Insurance Information:  [employer] [facility]      Evaluation date:  21  Diagnosis:  R humeral fracture in 2020 (non-op)   Precautions:  No heavy lifting   Evaluating Physical Therapist:  Harvel Mohs, DPT  Plan of care signed (Y/N):    Visit# / total visits: -       Subjective:   Pt reports no muscle soreness/ R shoulder pain following last session. Denies pain prior to session today.       Next follow-up:  2021     Exercises:   Exercise/Equipment Reps  During/ after  Other comments    UBE  8 min  Level #3   Alt direction every 2 min  Pt reports no pain during/ after     Pulleys  3 min flexion   3 min abduction                              Shoulder strengthening- prone  Flexion 3x8  Abduction 3x8   Extension 3x8    Retraction 3x8  Rows 3x8     2# dumbbell for all  Increased resistance today            No money ER with band  3x8  YTB     NT Triceps extensions       NT Straight arm pulldowns                                Home Exercise Program:    Table slides   Wall walks   No money ER with YTB added on 21     Comments:  Pt tolerated increased resistance on strengthening exercises well today with no complaints of increased pain     Pt is progressing well    Treatment/Activity Tolerance:  [x] Patient tolerated treatment well [] Patient limited by fatique  [] Patient limited by pain  [] Patient limited by other medical complications  [] Other:     Prognosis: [x] Good [] Fair  [] Poor    Patient Requires Follow-up: [] Yes  [x] No    Plan:   [x] Continue per plan of care [] Alter current plan (see comments)  [] Plan of care initiated [] Hold pending MD visit [] Discharge    Plan for Next Session:    Progress R shoulder strengthening as tolerated next session      See Weekly Progress Note: []  Yes  []  No  Next due:          CPT codes 6/2/2020 Units    Low Complexity PT evaluation 51701 40273    Moderate Complexity PT evaluation  47439    High Complexity PT evaluation 30816    PT Re-evaluation  [id number]    Gait training 86628    Manual therapy  [id number]    Therapeutic activities  45155    Therapeutic exercises  50934 3   Neuromuscular reeducation  [phone]        Time In: 8428  Time Out: 9131    Electronically signed by:    Kina Toledo DPT  [id number]

## 2021-03-04 ENCOUNTER — HOSPITAL ENCOUNTER (OUTPATIENT)
Dept: PHYSICAL THERAPY | Age: 31
Setting detail: THERAPIES SERIES
Discharge: HOME | End: 2021-03-04
Payer: COMMERCIAL

## 2021-03-04 PROCEDURE — 97530 THERAPEUTIC ACTIVITIES: CPT

## 2021-03-04 PROCEDURE — 97110 THERAPEUTIC EXERCISES: CPT

## 2021-03-04 NOTE — PROGRESS NOTES
[address] [facility]                Phone: [phone]   Fax: [phone]    Physical Therapy Daily Treatment Note  Date:  3/4/2021    Patient Name:  Devin Pulliam    :  1990  MRN: [id number]    Referring Physician:  Ayana Siddiqi  Insurance Information:  [facility]      Evaluation date:  21  Diagnosis:  R humeral fracture in 2020 (non-op)   Precautions:  No heavy lifting   Evaluating Physical Therapist:  Dolores Montenegro DPT  Plan of care signed (Y/N):    Visit# / total visits: -       Subjective:   Pt reports no pain at start of session. Pt states R UE feels sore due to sleeping on it over night. Pt reported difficulty with raising arm to shoulder height to put on deodorant as well as achieving end range abduction. Next follow-up:  2021     Exercises:   Exercise/Equipment Reps  During/ after  Other comments    UBE  8 min  Level #3   Alt direction every 2 min  Pt reports no pain during/ after     Pulleys  3 min flexion   3 min abduction                              Shoulder strengthening- prone  Flexion 3x10  Abduction 3x10   Extension 3x10   Retraction 3x10  Rows 3x10    2# dumbbell for all  Increased repetitions today     Supine ER in 90-90 3x8      No money ER with band  3x8  YTB      Triceps extensions  3x8 YTB     Straight arm pulldowns  3x8 YTB     Bicep curls  3x8 YTB     IR/ER 3x8 YTB     Shoulder abduction in standing 3x8      Shoulder flexion in standing 3x8 [place] TB        Home Exercise Program:    Table slides   Wall walks   No money ER with YTB added on 21  Supine ER in 90-90 position added on 2021      Comments:  Continued to focus on improving range of motion and strengthening of R UE   Pt tolerated additional exercises this session with no complaints throughout  Added exercises to improve external rotation and abduction range of motion and strength, since pt reports difficulty with these movements.

## 2021-03-11 ENCOUNTER — OFFICE VISIT (OUTPATIENT)
Dept: ORTHOPEDIC SURGERY | Age: 31
End: 2021-03-11
Payer: COMMERCIAL

## 2021-03-11 ENCOUNTER — HOSPITAL ENCOUNTER (OUTPATIENT)
Dept: GENERAL RADIOLOGY | Age: 31
Discharge: HOME | End: 2021-03-13
Payer: COMMERCIAL

## 2021-03-11 VITALS — TEMPERATURE: 98.2 F

## 2021-03-11 DIAGNOSIS — S42.254A NONDISPLACED FRACTURE OF GREATER TUBEROSITY OF RIGHT HUMERUS, INITIAL ENCOUNTER FOR CLOSED FRACTURE: Primary | ICD-10-CM

## 2021-03-11 DIAGNOSIS — S42.254A NONDISPLACED FRACTURE OF GREATER TUBEROSITY OF RIGHT HUMERUS, INITIAL ENCOUNTER FOR CLOSED FRACTURE: ICD-10-CM

## 2021-03-11 PROCEDURE — 99212 OFFICE O/P EST SF 10 MIN: CPT

## 2021-03-11 PROCEDURE — 73030 X-RAY EXAM OF SHOULDER: CPT

## 2021-03-11 PROCEDURE — 99213 OFFICE O/P EST LOW 20 MIN: CPT | Performed by: PHYSICIAN ASSISTANT

## 2021-03-11 NOTE — PROGRESS NOTES
Chief Complaint   Patient presents with    Follow-up     Right shoulder fx f/u   DOI 11-       DOI 11/24/20  Conservative management - R greater tuberosity of R humerus nondisplaced fx     Subjective:  Daily Guzman is approximately 4.5 mo from above DOI. She has been WBAT to the R shoulder. Minimal pain to the R shoulder at this time. 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 No new injuries or other complaints. Active in therapy and HEP mostly working on strengthening and ROM at this point. Denies paresthesias. Review of Systems -    General ROS: negative for - chills, fatigue, fever or night sweats  Respiratory ROS: no cough, shortness of breath, or wheezing  Cardiovascular ROS: no chest pain or dyspnea on exertion  Gastrointestinal ROS: no abdominal pain, nausea, vomiting, diarrhea, constipation,or black or bloody stools  Genitourinary: no hematuria, dysuria, or incontinence   Musculoskeletal ROS: negative for -back or neck pain or stiffness, also see HPI  Neurological ROS: no TIA or stroke symptoms       Objective:    General: Alert and oriented X 3, normocephalic atraumatic, external ears and eye normal, sclera clear, no acute distress, respirations easy and unlabored with no audible wheezes, skin warm and dry, speech and dress appropriate for noted age, affect euthymic. Extremity:  Right Upper Extremity  Skin is clean dry and intact  No edema noted  Radial pulse palpable, fingers warm with BCR  Flex/extension intact to elbow, wrist, thumb and fingers  Finger opposition intact  Finger adduction/abduction intact  Finger crossover intact  Subjectively states sensation intact to radial/medial/ulnar distribution  AROM R Shoulder flexion 180 abduction 180 IR thoracic spine ER 90  nontender about the shoulder and greater tuberosity to palpation         Temp 98.2 °F (36.8 °C) (Oral)     XR:   3 views of R shoulder demonstrating healed greater tuberosity fx.  No acute fractures or dislocations or any other osseus abnormality identified. Assessment:   Diagnosis Orders   1. Nondisplaced fracture of greater tuberosity of right humerus, initial encounter for closed fracture         Plan:   Reviewed x-rays with patient today in office    WBAT R UE   Paperwork for RTW completed today   otc analgesics and RICE PRN   Continue OT and HEP    Follow up PRN    Electronically signed by Katlin Novak PA-C on 3/11/2021 at 8:29 AM  Note: This report was completed using [employer] voiced recognition software. Every effort has been made to ensure accuracy; however, inadvertent computerized transcription errors may be present.

## 2021-03-12 ENCOUNTER — HOSPITAL ENCOUNTER (OUTPATIENT)
Dept: PHYSICAL THERAPY | Age: 31
Setting detail: THERAPIES SERIES
Discharge: HOME | End: 2021-03-12
Payer: COMMERCIAL

## 2021-03-12 PROCEDURE — 97530 THERAPEUTIC ACTIVITIES: CPT

## 2021-03-12 PROCEDURE — 97110 THERAPEUTIC EXERCISES: CPT

## 2021-03-12 NOTE — PROGRESS NOTES
[address] [facility]                Phone: [phone]   Fax: [phone]    Physical Therapy Daily Treatment Note  Date:  3/12/2021    Patient Name:  Marya Andrade    :  1990  MRN: [id number]    Referring Physician:  Denton Morley  Insurance Information:  [facility]      Evaluation date:  21  Diagnosis:  R humeral fracture in 2020 (non-op)   Precautions:  No heavy lifting   Evaluating Physical Therapist:  Mendy Blackburn DPT  Plan of care signed (Y/N):    Visit# / total visits: -       Subjective:   Pt reports no pain prior to session today. She had follow up with orthopedics and per note fracture is healed. Pt reports that return to work paperwork is filled out and she does not have to follow up with orthopedics again. Exercises:   Exercise/Equipment Reps  During/ after  Other comments    UBE  8 min  Level #6 (increased resistance today)  Alt direction every 2 min  Pt reports no pain during/ after     Pulleys  3 min flexion   3 min abduction                              Shoulder strengthening  Flexion 3x6  Abduction 3x6   Extension 3x6  Retraction 3x6  Rows 3x6    4.25 # dumbbell for all  Increased repetitions today            No money ER with band  3x12  YTB      Triceps extensions  3x12 YTB     Straight arm pulldowns  3x12 YTB     Bicep curls  3x12 4.75#    NT IR/ER 3x12 YTB     Shoulder abduction in standing 3x6 2#     Shoulder flexion in standing 3x6 4.25#        Home Exercise Program:    Table slides   Wall walks   No money ER with YTB added on 21  Supine ER in 90-90 position added on 2021      Comments:  Pt tolerated session well with no complaints of R humeral pain   Mild R shoulder muscle fatigue by end of session rated 3/10    Progressing well.       Treatment/Activity Tolerance:  [x] Patient tolerated treatment well [] Patient limited by fatique  [] Patient limited by pain  [] Patient limited by other medical complications  [] Other:     Prognosis: [x] Good [] Fair  [] Poor    Patient Requires Follow-up: [] Yes  [x] No    Plan:   [x] Continue per plan of care [] Alter current plan (see comments)  [] Plan of care initiated [] Hold pending MD visit [] Discharge    Plan for Next Session:    Progress R shoulder strengthening as tolerated next session      See Weekly Progress Note: []  Yes  []  No  Next due:          CPT codes 6/2/2020 Units    Low Complexity PT evaluation 07126 31735    Moderate Complexity PT evaluation  63435    High Complexity PT evaluation 57432    PT Re-evaluation  36116    Gait training 02410    Manual therapy  [id number]    Therapeutic activities  33181 1   Therapeutic exercises  84920 2   Neuromuscular reeducation  [phone]        Time In: 0711  Time Out: 0848     Electronically signed by:    Dolores Montenegro DPT  [id number]

## 2021-03-19 ENCOUNTER — HOSPITAL ENCOUNTER (OUTPATIENT)
Dept: PHYSICAL THERAPY | Age: 31
Setting detail: THERAPIES SERIES
Discharge: HOME | End: 2021-03-19
Payer: COMMERCIAL

## 2021-03-19 PROCEDURE — 97110 THERAPEUTIC EXERCISES: CPT

## 2021-03-19 PROCEDURE — 97530 THERAPEUTIC ACTIVITIES: CPT

## 2021-03-19 NOTE — PROGRESS NOTES
[address] [facility]                Phone: [phone]   Fax: [phone]    Physical Therapy Daily Treatment Note  Date:  3/19/2021    Patient Name:  Marya Andrade    :  1990  MRN: [id number]    Referring Physician:  Denton Morlye  Insurance Information:  [facility]      Evaluation date:  21  Diagnosis:  R humeral fracture in 2020 (non-op)   Precautions:  No heavy lifting   Evaluating Physical Therapist:  Mendy Blackburn DPT  Plan of care signed (Y/N):    Visit# / total visits: -       Subjective:   Pt reports no pain prior to session today. States that she is back to work. Exercises:   Exercise/Equipment Reps  During/ after  Other comments    UBE  8 min  Level #5 (increased resistance today)  Alt direction every 2 min  Pt reports no pain during/ after    NT Pulleys  3 min flexion   3 min abduction                              Shoulder strengthening  Flexion 3x8  Abduction 3x8   Extension 3x8  Retraction 3x8  Rows 3x8    5 # dumbbell for all  Increased resistance and repetitions today            No money ER with band  3x15  YTB      Triceps extensions  3x15 YTB     Straight arm pulldowns  3x15 YTB     Bicep curls  3x15 5#    NT IR/ER 3x12 YTB     Shoulder abduction in standing 3x8 2#     Shoulder flexion in standing 3x8 5#        Home Exercise Program:    Table slides   Wall walks   No money ER with YTB added on 21  Supine ER in 90-90 position added on 2021      Comments:  Pt tolerated session well with no complaints of R humeral pain   Increased resistance and reps on all strengthening exercises today   Pt progressing well in therapy  Mild R shoulder fatigue by end of session today rated 3/10.       Treatment/Activity Tolerance:  [x] Patient tolerated treatment well [] Patient limited by fatique  [] Patient limited by pain  [] Patient limited by other medical complications  [] Other:     Prognosis: [x] Good [] Fair  [] Poor    Patient Requires Follow-up: [] Yes  [x] No    Plan:   [] Continue per plan of care [] Alter current plan (see comments)  [] Plan of care initiated [x] Hold for 1-2 weeks for pt to work on HEP then discharge if she is doing well at that time [] Discharge    Plan for Next Session:    Email HEP to [email or web address]       See Weekly Progress Note: []  Yes  [x]  No  Next due:          CPT codes 6/2/2020 Units    Low Complexity PT evaluation 75851 35666    Moderate Complexity PT evaluation  70005    High Complexity PT evaluation 34243    PT Re-evaluation  [id number]    Gait training 41719    Manual therapy  [id number]    Therapeutic activities  73081 1   Therapeutic exercises  30479 2   Neuromuscular reeducation  [id number]        Time In: 2686  Time Out: 3602      Electronically signed by:    Isabel Orozco DPT  [id number]

## 2021-06-11 ENCOUNTER — HOSPITAL ENCOUNTER (OUTPATIENT)
Dept: PHYSICAL THERAPY | Age: 31
Setting detail: THERAPIES SERIES
Discharge: HOME | End: 2021-06-11
Payer: COMMERCIAL

## 2021-09-09 LAB
CHOLESTEROL, TOTAL: 165 MG/DL (ref 0–199)
GLUCOSE BLD-MCNC: 94 MG/DL (ref 74–107)
HDLC SERPL-MCNC: 39 MG/DL
LDL CHOLESTEROL CALCULATED: 111 MG/DL (ref 0–99)
TRIGL SERPL-MCNC: 74 MG/DL (ref 0–149)